# Patient Record
Sex: FEMALE | Race: WHITE | NOT HISPANIC OR LATINO
[De-identification: names, ages, dates, MRNs, and addresses within clinical notes are randomized per-mention and may not be internally consistent; named-entity substitution may affect disease eponyms.]

---

## 2023-09-20 ENCOUNTER — APPOINTMENT (OUTPATIENT)
Dept: OBGYN | Facility: CLINIC | Age: 23
End: 2023-09-20
Payer: COMMERCIAL

## 2023-09-20 ENCOUNTER — LABORATORY RESULT (OUTPATIENT)
Age: 23
End: 2023-09-20

## 2023-09-20 VITALS
HEART RATE: 80 BPM | SYSTOLIC BLOOD PRESSURE: 132 MMHG | BODY MASS INDEX: 22.71 KG/M2 | DIASTOLIC BLOOD PRESSURE: 88 MMHG | HEIGHT: 64 IN | WEIGHT: 133 LBS

## 2023-09-20 DIAGNOSIS — Z01.419 ENCOUNTER FOR GYNECOLOGICAL EXAMINATION (GENERAL) (ROUTINE) W/OUT ABNORMAL FINDINGS: ICD-10-CM

## 2023-09-20 DIAGNOSIS — Z20.2 CONTACT WITH AND (SUSPECTED) EXPOSURE TO INFECTIONS WITH A PREDOMINANTLY SEXUAL MODE OF TRANSMISSION: ICD-10-CM

## 2023-09-20 DIAGNOSIS — Z80.3 FAMILY HISTORY OF MALIGNANT NEOPLASM OF BREAST: ICD-10-CM

## 2023-09-20 DIAGNOSIS — L68.0 HIRSUTISM: ICD-10-CM

## 2023-09-20 DIAGNOSIS — R30.0 DYSURIA: ICD-10-CM

## 2023-09-20 DIAGNOSIS — R53.83 OTHER FATIGUE: ICD-10-CM

## 2023-09-20 DIAGNOSIS — N91.2 AMENORRHEA, UNSPECIFIED: ICD-10-CM

## 2023-09-20 PROBLEM — Z00.00 ENCOUNTER FOR PREVENTIVE HEALTH EXAMINATION: Status: ACTIVE | Noted: 2023-09-20

## 2023-09-20 PROCEDURE — 99385 PREV VISIT NEW AGE 18-39: CPT

## 2023-09-20 RX ORDER — ALPRAZOLAM 2 MG/1
TABLET ORAL
Refills: 0 | Status: ACTIVE | COMMUNITY

## 2023-09-25 LAB — CYTOLOGY CVX/VAG DOC THIN PREP: ABNORMAL

## 2023-10-08 LAB — HCG SERPL-MCNC: <1 MIU/ML

## 2023-10-09 LAB
BACTERIA UR CULT: NORMAL
C TRACH RRNA SPEC QL NAA+PROBE: NOT DETECTED
HBV SURFACE AG SER QL: NONREACTIVE
HCV RNA SERPL NAA+PROBE-LOG IU: NOT DETECTED LOGIU/ML
HEPC RNA INTERP: NOT DETECTED
HIV1+2 AB SPEC QL IA.RAPID: NONREACTIVE
N GONORRHOEA RRNA SPEC QL NAA+PROBE: NOT DETECTED
SOURCE AMPLIFICATION: NORMAL
T PALLIDUM AB SER QL IA: NEGATIVE
T4 FREE SERPL-MCNC: 1.4 NG/DL
TSH SERPL-ACNC: 1.37 UIU/ML

## 2023-11-13 ENCOUNTER — EMERGENCY (EMERGENCY)
Facility: HOSPITAL | Age: 23
LOS: 1 days | Discharge: HOME | End: 2023-11-13
Attending: EMERGENCY MEDICINE | Admitting: EMERGENCY MEDICINE
Payer: COMMERCIAL

## 2023-11-13 VITALS
SYSTOLIC BLOOD PRESSURE: 136 MMHG | RESPIRATION RATE: 18 BRPM | DIASTOLIC BLOOD PRESSURE: 84 MMHG | HEART RATE: 105 BPM | TEMPERATURE: 98 F | OXYGEN SATURATION: 99 %

## 2023-11-13 VITALS
TEMPERATURE: 98 F | SYSTOLIC BLOOD PRESSURE: 129 MMHG | DIASTOLIC BLOOD PRESSURE: 81 MMHG | RESPIRATION RATE: 18 BRPM | OXYGEN SATURATION: 99 % | HEART RATE: 100 BPM

## 2023-11-13 LAB
ALBUMIN SERPL ELPH-MCNC: 4 G/DL — SIGNIFICANT CHANGE UP (ref 3.3–5)
ALBUMIN SERPL ELPH-MCNC: 4 G/DL — SIGNIFICANT CHANGE UP (ref 3.3–5)
ALP SERPL-CCNC: 49 U/L — SIGNIFICANT CHANGE UP (ref 40–120)
ALP SERPL-CCNC: 49 U/L — SIGNIFICANT CHANGE UP (ref 40–120)
ALT FLD-CCNC: 7 U/L — SIGNIFICANT CHANGE UP (ref 4–33)
ALT FLD-CCNC: 7 U/L — SIGNIFICANT CHANGE UP (ref 4–33)
ANION GAP SERPL CALC-SCNC: 9 MMOL/L — SIGNIFICANT CHANGE UP (ref 7–14)
ANION GAP SERPL CALC-SCNC: 9 MMOL/L — SIGNIFICANT CHANGE UP (ref 7–14)
APPEARANCE UR: CLEAR — SIGNIFICANT CHANGE UP
APPEARANCE UR: CLEAR — SIGNIFICANT CHANGE UP
APTT BLD: 26.2 SEC — SIGNIFICANT CHANGE UP (ref 24.5–35.6)
APTT BLD: 26.2 SEC — SIGNIFICANT CHANGE UP (ref 24.5–35.6)
AST SERPL-CCNC: 11 U/L — SIGNIFICANT CHANGE UP (ref 4–32)
AST SERPL-CCNC: 11 U/L — SIGNIFICANT CHANGE UP (ref 4–32)
BASOPHILS # BLD AUTO: 0.05 K/UL — SIGNIFICANT CHANGE UP (ref 0–0.2)
BASOPHILS # BLD AUTO: 0.05 K/UL — SIGNIFICANT CHANGE UP (ref 0–0.2)
BASOPHILS NFR BLD AUTO: 0.4 % — SIGNIFICANT CHANGE UP (ref 0–2)
BASOPHILS NFR BLD AUTO: 0.4 % — SIGNIFICANT CHANGE UP (ref 0–2)
BILIRUB SERPL-MCNC: <0.2 MG/DL — SIGNIFICANT CHANGE UP (ref 0.2–1.2)
BILIRUB SERPL-MCNC: <0.2 MG/DL — SIGNIFICANT CHANGE UP (ref 0.2–1.2)
BILIRUB UR-MCNC: NEGATIVE — SIGNIFICANT CHANGE UP
BILIRUB UR-MCNC: NEGATIVE — SIGNIFICANT CHANGE UP
BLD GP AB SCN SERPL QL: NEGATIVE — SIGNIFICANT CHANGE UP
BLD GP AB SCN SERPL QL: NEGATIVE — SIGNIFICANT CHANGE UP
BUN SERPL-MCNC: 14 MG/DL — SIGNIFICANT CHANGE UP (ref 7–23)
BUN SERPL-MCNC: 14 MG/DL — SIGNIFICANT CHANGE UP (ref 7–23)
CALCIUM SERPL-MCNC: 9.1 MG/DL — SIGNIFICANT CHANGE UP (ref 8.4–10.5)
CALCIUM SERPL-MCNC: 9.1 MG/DL — SIGNIFICANT CHANGE UP (ref 8.4–10.5)
CHLORIDE SERPL-SCNC: 107 MMOL/L — SIGNIFICANT CHANGE UP (ref 98–107)
CHLORIDE SERPL-SCNC: 107 MMOL/L — SIGNIFICANT CHANGE UP (ref 98–107)
CO2 SERPL-SCNC: 22 MMOL/L — SIGNIFICANT CHANGE UP (ref 22–31)
CO2 SERPL-SCNC: 22 MMOL/L — SIGNIFICANT CHANGE UP (ref 22–31)
COLOR SPEC: YELLOW — SIGNIFICANT CHANGE UP
COLOR SPEC: YELLOW — SIGNIFICANT CHANGE UP
CREAT SERPL-MCNC: 0.73 MG/DL — SIGNIFICANT CHANGE UP (ref 0.5–1.3)
CREAT SERPL-MCNC: 0.73 MG/DL — SIGNIFICANT CHANGE UP (ref 0.5–1.3)
DIFF PNL FLD: NEGATIVE — SIGNIFICANT CHANGE UP
DIFF PNL FLD: NEGATIVE — SIGNIFICANT CHANGE UP
EGFR: 118 ML/MIN/1.73M2 — SIGNIFICANT CHANGE UP
EGFR: 118 ML/MIN/1.73M2 — SIGNIFICANT CHANGE UP
EOSINOPHIL # BLD AUTO: 0.12 K/UL — SIGNIFICANT CHANGE UP (ref 0–0.5)
EOSINOPHIL # BLD AUTO: 0.12 K/UL — SIGNIFICANT CHANGE UP (ref 0–0.5)
EOSINOPHIL NFR BLD AUTO: 1 % — SIGNIFICANT CHANGE UP (ref 0–6)
EOSINOPHIL NFR BLD AUTO: 1 % — SIGNIFICANT CHANGE UP (ref 0–6)
GLUCOSE SERPL-MCNC: 90 MG/DL — SIGNIFICANT CHANGE UP (ref 70–99)
GLUCOSE SERPL-MCNC: 90 MG/DL — SIGNIFICANT CHANGE UP (ref 70–99)
GLUCOSE UR QL: NEGATIVE MG/DL — SIGNIFICANT CHANGE UP
GLUCOSE UR QL: NEGATIVE MG/DL — SIGNIFICANT CHANGE UP
HCG SERPL-ACNC: 1168 MIU/ML — SIGNIFICANT CHANGE UP
HCG SERPL-ACNC: 1168 MIU/ML — SIGNIFICANT CHANGE UP
HCT VFR BLD CALC: 36.8 % — SIGNIFICANT CHANGE UP (ref 34.5–45)
HCT VFR BLD CALC: 36.8 % — SIGNIFICANT CHANGE UP (ref 34.5–45)
HGB BLD-MCNC: 12.3 G/DL — SIGNIFICANT CHANGE UP (ref 11.5–15.5)
HGB BLD-MCNC: 12.3 G/DL — SIGNIFICANT CHANGE UP (ref 11.5–15.5)
IANC: 7.97 K/UL — HIGH (ref 1.8–7.4)
IANC: 7.97 K/UL — HIGH (ref 1.8–7.4)
IMM GRANULOCYTES NFR BLD AUTO: 0.3 % — SIGNIFICANT CHANGE UP (ref 0–0.9)
IMM GRANULOCYTES NFR BLD AUTO: 0.3 % — SIGNIFICANT CHANGE UP (ref 0–0.9)
INR BLD: <0.9 RATIO — SIGNIFICANT CHANGE UP (ref 0.85–1.18)
INR BLD: <0.9 RATIO — SIGNIFICANT CHANGE UP (ref 0.85–1.18)
KETONES UR-MCNC: NEGATIVE MG/DL — SIGNIFICANT CHANGE UP
KETONES UR-MCNC: NEGATIVE MG/DL — SIGNIFICANT CHANGE UP
LEUKOCYTE ESTERASE UR-ACNC: NEGATIVE — SIGNIFICANT CHANGE UP
LEUKOCYTE ESTERASE UR-ACNC: NEGATIVE — SIGNIFICANT CHANGE UP
LYMPHOCYTES # BLD AUTO: 2.62 K/UL — SIGNIFICANT CHANGE UP (ref 1–3.3)
LYMPHOCYTES # BLD AUTO: 2.62 K/UL — SIGNIFICANT CHANGE UP (ref 1–3.3)
LYMPHOCYTES # BLD AUTO: 22.1 % — SIGNIFICANT CHANGE UP (ref 13–44)
LYMPHOCYTES # BLD AUTO: 22.1 % — SIGNIFICANT CHANGE UP (ref 13–44)
MCHC RBC-ENTMCNC: 31.9 PG — SIGNIFICANT CHANGE UP (ref 27–34)
MCHC RBC-ENTMCNC: 31.9 PG — SIGNIFICANT CHANGE UP (ref 27–34)
MCHC RBC-ENTMCNC: 33.4 GM/DL — SIGNIFICANT CHANGE UP (ref 32–36)
MCHC RBC-ENTMCNC: 33.4 GM/DL — SIGNIFICANT CHANGE UP (ref 32–36)
MCV RBC AUTO: 95.6 FL — SIGNIFICANT CHANGE UP (ref 80–100)
MCV RBC AUTO: 95.6 FL — SIGNIFICANT CHANGE UP (ref 80–100)
MONOCYTES # BLD AUTO: 1.03 K/UL — HIGH (ref 0–0.9)
MONOCYTES # BLD AUTO: 1.03 K/UL — HIGH (ref 0–0.9)
MONOCYTES NFR BLD AUTO: 8.7 % — SIGNIFICANT CHANGE UP (ref 2–14)
MONOCYTES NFR BLD AUTO: 8.7 % — SIGNIFICANT CHANGE UP (ref 2–14)
NEUTROPHILS # BLD AUTO: 7.97 K/UL — HIGH (ref 1.8–7.4)
NEUTROPHILS # BLD AUTO: 7.97 K/UL — HIGH (ref 1.8–7.4)
NEUTROPHILS NFR BLD AUTO: 67.5 % — SIGNIFICANT CHANGE UP (ref 43–77)
NEUTROPHILS NFR BLD AUTO: 67.5 % — SIGNIFICANT CHANGE UP (ref 43–77)
NITRITE UR-MCNC: NEGATIVE — SIGNIFICANT CHANGE UP
NITRITE UR-MCNC: NEGATIVE — SIGNIFICANT CHANGE UP
NRBC # BLD: 0 /100 WBCS — SIGNIFICANT CHANGE UP (ref 0–0)
NRBC # BLD: 0 /100 WBCS — SIGNIFICANT CHANGE UP (ref 0–0)
NRBC # FLD: 0 K/UL — SIGNIFICANT CHANGE UP (ref 0–0)
NRBC # FLD: 0 K/UL — SIGNIFICANT CHANGE UP (ref 0–0)
PH UR: 6 — SIGNIFICANT CHANGE UP (ref 5–8)
PH UR: 6 — SIGNIFICANT CHANGE UP (ref 5–8)
PLATELET # BLD AUTO: 336 K/UL — SIGNIFICANT CHANGE UP (ref 150–400)
PLATELET # BLD AUTO: 336 K/UL — SIGNIFICANT CHANGE UP (ref 150–400)
POTASSIUM SERPL-MCNC: 4.2 MMOL/L — SIGNIFICANT CHANGE UP (ref 3.5–5.3)
POTASSIUM SERPL-MCNC: 4.2 MMOL/L — SIGNIFICANT CHANGE UP (ref 3.5–5.3)
POTASSIUM SERPL-SCNC: 4.2 MMOL/L — SIGNIFICANT CHANGE UP (ref 3.5–5.3)
POTASSIUM SERPL-SCNC: 4.2 MMOL/L — SIGNIFICANT CHANGE UP (ref 3.5–5.3)
PROT SERPL-MCNC: 6.7 G/DL — SIGNIFICANT CHANGE UP (ref 6–8.3)
PROT SERPL-MCNC: 6.7 G/DL — SIGNIFICANT CHANGE UP (ref 6–8.3)
PROT UR-MCNC: SIGNIFICANT CHANGE UP MG/DL
PROT UR-MCNC: SIGNIFICANT CHANGE UP MG/DL
PROTHROM AB SERPL-ACNC: 9.6 SEC — SIGNIFICANT CHANGE UP (ref 9.5–13)
PROTHROM AB SERPL-ACNC: 9.6 SEC — SIGNIFICANT CHANGE UP (ref 9.5–13)
RBC # BLD: 3.85 M/UL — SIGNIFICANT CHANGE UP (ref 3.8–5.2)
RBC # BLD: 3.85 M/UL — SIGNIFICANT CHANGE UP (ref 3.8–5.2)
RBC # FLD: 11.9 % — SIGNIFICANT CHANGE UP (ref 10.3–14.5)
RBC # FLD: 11.9 % — SIGNIFICANT CHANGE UP (ref 10.3–14.5)
RH IG SCN BLD-IMP: NEGATIVE — SIGNIFICANT CHANGE UP
RH IG SCN BLD-IMP: NEGATIVE — SIGNIFICANT CHANGE UP
SODIUM SERPL-SCNC: 138 MMOL/L — SIGNIFICANT CHANGE UP (ref 135–145)
SODIUM SERPL-SCNC: 138 MMOL/L — SIGNIFICANT CHANGE UP (ref 135–145)
SP GR SPEC: 1.03 — SIGNIFICANT CHANGE UP (ref 1–1.03)
SP GR SPEC: 1.03 — SIGNIFICANT CHANGE UP (ref 1–1.03)
UROBILINOGEN FLD QL: 0.2 MG/DL — SIGNIFICANT CHANGE UP (ref 0.2–1)
UROBILINOGEN FLD QL: 0.2 MG/DL — SIGNIFICANT CHANGE UP (ref 0.2–1)
WBC # BLD: 11.83 K/UL — HIGH (ref 3.8–10.5)
WBC # BLD: 11.83 K/UL — HIGH (ref 3.8–10.5)
WBC # FLD AUTO: 11.83 K/UL — HIGH (ref 3.8–10.5)
WBC # FLD AUTO: 11.83 K/UL — HIGH (ref 3.8–10.5)

## 2023-11-13 PROCEDURE — 76817 TRANSVAGINAL US OBSTETRIC: CPT | Mod: 26

## 2023-11-13 PROCEDURE — 99244 OFF/OP CNSLTJ NEW/EST MOD 40: CPT

## 2023-11-13 PROCEDURE — 99285 EMERGENCY DEPT VISIT HI MDM: CPT

## 2023-11-13 RX ORDER — ACETAMINOPHEN 500 MG
650 TABLET ORAL ONCE
Refills: 0 | Status: COMPLETED | OUTPATIENT
Start: 2023-11-13 | End: 2023-11-13

## 2023-11-13 RX ADMIN — Medication 650 MILLIGRAM(S): at 21:25

## 2023-11-13 NOTE — ED PROVIDER NOTE - PROGRESS NOTE DETAILS
JOHAN Oliveira: Spoke with GYN will see pt in the ED, US with intrauterine gestation sac, no fetal pole or yolk sac. JOHAN Oliveira: Spoke with GYN, recommending [facility]-hour beta check at [facility] per patient preference.  Patient does have a private OB/GYN but prefers to follow-up with [facility] for this pregnancy.  Discussed Rh-, OB/GYN recommending at time of termination she be given RhoGAM and advised to return to the ER if she begins to have vaginal bleeding prior to this.  Patient verbalized understanding to have repeat lab work and ultrasound performed in 48 hours she will return to the ER with any worsening or concerning symptoms.

## 2023-11-13 NOTE — ED PROVIDER NOTE - PATIENT PORTAL LINK FT
You can access the FollowMyHealth Patient Portal offered by [facility] by registering at the following website: [email or web address]. By joining [employer]’s FollowMyHealth portal, you will also be able to view your health information using other applications (apps) compatible with our system.

## 2023-11-13 NOTE — CONSULT NOTE ADULT - SUBJECTIVE AND OBJECTIVE BOX
CAROLANN MONET    23y    Female    [id number]    22yo G_P_ with PMSHx significant for * presents with *    HPI:      Name of Ob/Gyn Physician:  OBHx:  GynHx: Denies  PMHx: Denies  PSHx: Denies  Meds: Denies  All: NKDA      PAST MEDICAL & SURGICAL HISTORY:  No pertinent past medical history      No significant past surgical history          MEDICATIONS  (STANDING):    MEDICATIONS  (PRN):      Allergies    No Known Allergies    Intolerances        SOCIAL HISTORY:    FAMILY HISTORY:      REVIEW OF SYSTEMS  General: denies fevers, chills, tiredness  Skin/Breast: denies breast pain  Respiratory and Thorax: denies shortness of breath, denies cough  Cardiovascular: denies chest pain and denies palpitations  Gastrointestinal: denies abdominal pain, nausea/ vomiting	  Genitourinary: denies dysuria, increased urinary frequency, urgency	  Constitutional, Cardiovascular, Respiratory, Gastrointestinal, Genitourinary, Musculoskeletal and Integumentary review of systems are normal except as noted. 	      Vital Signs Last 24 Hrs  T(C): 36.8 (2023 23:11), Max: 36.8 (2023 18:44)  T(F): 98.2 (2023 23:11), Max: 98.3 (2023 23:00)  HR: 100 (2023 23:11) (87 - 105)  BP: 129/81 (2023 23:11) (124/89 - 136/84)  BP(mean): --  RR: 18 (2023 23:11) (18 - 18)  SpO2: 99% (2023 23:11) (99% - 100%)    Parameters below as of 2023 23:00  Patient On (Oxygen Delivery Method): room air        PHYSICAL EXAM:   Exam performed with Chaperone **  Gen: NAD   Cardiovascular: Clinically well perfused  Respiratory: Breathing comfortably on RA  Abd: Soft, non-tender, non-distended  Pelvic: Speculum - no blood in vaginal vault, cervix closed/long; Bimanual - no CMT, no adnexal tenderness or palpable masses, no uterine tenderness  Extremities: Non-tender, non-edematous; able to move all ext equally  Neuro: AOx3      LABS:                        12.3   11.83 )-----------( 336      ( 2023 20:51 )             36.8     11-13    138  |  107  |  14  ----------------------------<  90  4.2   |  22  |  0.73    Ca    9.1      2023 20:51    TPro  6.7  /  Alb  4.0  /  TBili  <0.2  /  DBili  x   /  AST  11  /  ALT  7   /  AlkPhos  49  11-13    PT/INR - ( 2023 20:51 )   PT: 9.6 sec;   INR: <0.90 ratio         PTT - ( 2023 20:51 )  PTT:26.2 sec  Urinalysis Basic - ( 2023 20:51 )    Color: Yellow / Appearance: Clear / S.028 / pH: x  Gluc: 90 mg/dL / Ketone: Negative mg/dL  / Bili: Negative / Urobili: 0.2 mg/dL   Blood: x / Protein: Trace mg/dL / Nitrite: Negative   Leuk Esterase: Negative / RBC: x / WBC x   Sq Epi: x / Non Sq Epi: x / Bacteria: x        RADIOLOGY & ADDITIONAL STUDIES:   CAROLANN MONET    23y    Female    [id number]    24yo  at 8 3/7 weeks by LMP 9/15/23 presenting with L-sided pelvic pain x1 week in the setting of PUL. [facility], however reports ultrasound did not identify any intra- or extrauterine gestation at that time. She returned to  today, , for repeat blood work. Patient does not know her results from [facility] and cannot log in to the portal. She reports dull, left-sided pelvic pain off/on for at least 7 days. Today patient presented to the ED given concern for ectopic in the setting of PUL and pelvic pain.     Patient sees Dr. Ortega for Ob/Gyn however is following at  for this pregnancy given personal reasons.      Name of Ob/Gyn Physician: Dr. Charlene Ortega  OBHx: G1  GynHx: Denies  PMHx: Denies  PSHx: Denies  Meds: None  All: NKDA      REVIEW OF SYSTEMS  General: denies fevers, chills, tiredness  Skin/Breast: denies breast pain  Respiratory and Thorax: denies shortness of breath, denies cough  Cardiovascular: denies chest pain and denies palpitations  Gastrointestinal: denies abdominal pain, nausea/ vomiting	  Genitourinary: denies dysuria, increased urinary frequency, urgency	  Constitutional, Cardiovascular, Respiratory, Gastrointestinal, Genitourinary, Musculoskeletal and Integumentary review of systems are normal except as noted. 	      Vital Signs Last 24 Hrs  T(C): 36.8 (2023 23:11), Max: 36.8 (2023 18:44)  T(F): 98.2 (2023 23:11), Max: 98.3 (2023 23:00)  HR: 100 (2023 23:11) (87 - 105)  BP: 129/81 (2023 23:11) (124/89 - 136/84)  BP(mean): --  RR: 18 (2023 23:11) (18 - 18)  SpO2: 99% (2023 23:11) (99% - 100%)    Parameters below as of 2023 23:00  Patient On (Oxygen Delivery Method): room air        PHYSICAL EXAM:   Exam performed with chaperone Kaylynn, ED Tech  Gen: NAD   Cardiovascular: Clinically well perfused  Respiratory: Breathing comfortably on RA  Abd: Soft, non-tender, non-distended, no rebound/guarding  Pelvic: Bimanual - no CMT, no adnexal tenderness or palpable masses, no uterine tenderness  Extremities: Non-tender, non-edematous; able to move all ext equally  Neuro: AOx3      LABS:                        12.3   11.83 )-----------( 336      ( 2023 20:51 )             36.8     11-13    138  |  107  |  14  ----------------------------<  90  4.2   |  22  |  0.73    Ca    9.1      2023 20:51    TPro  6.7  /  Alb  4.0  /  TBili  <0.2  /  DBili  x   /  AST  11  /  ALT  7   /  AlkPhos  49  11-13    PT/INR - ( 2023 20:51 )   PT: 9.6 sec;   INR: <0.90 ratio         PTT - ( 2023 20:51 )  PTT:26.2 sec  Urinalysis Basic - ( 2023 20:51 )    Color: Yellow / Appearance: Clear / S.028 / pH: x  Gluc: 90 mg/dL / Ketone: Negative mg/dL  / Bili: Negative / Urobili: 0.2 mg/dL   Blood: x / Protein: Trace mg/dL / Nitrite: Negative   Leuk Esterase: Negative / RBC: x / WBC x   Sq Epi: x / Non Sq Epi: x / Bacteria: x        RADIOLOGY & ADDITIONAL STUDIES:  < from: US Transvaginal, OB (23 @ 20:53) >  ACC: [id number] EXAM:  US OB TRANSVAGINAL   ORDERED BY: GAVINO PANG     PROCEDURE DATE:  2023          INTERPRETATION:  CLINICAL INFORMATION: Patient last menstrual period in   September with left sided pelvic pain, evaluate for ectopic pregnancy    LMP: 9/15/2023    Estimated Gestational Age by LMP: 8 weeks 3 days    COMPARISON: None available.    TECHNIQUE: Endovaginal pelvic sonogram only. Color and Spectral Doppler   was performed.    FINDINGS:    Uterus: 6.5 x 3.2 x 4.0 cm.  Single intrauterine gestational sac with a yolk sac or a fetal pole.  Mean sac diameter: 0.2 cm (4 weeks 5 days)    Cervix: Closed.  Right ovary: 1.9 cm x 0.8 cm x 1.0 cm. Within normal limits. Normal   arterial and venous waveforms.  Left ovary: 3.5 cm x2.5 cm x 2.4 cm. A 1.9 x 1.8 x 1.0 cm corpus luteum.   Normal arterial and venous waveforms.  Fluid: None.    IMPRESSION:    Single intrauterine gestational sac without a fetal pole or yolk sac,   which may be due to early gestation. Recommend follow-up to determine   viability and further dating.      --- End of Report ---    < end of copied text >   CAROLANN MONET    23y    Female    [id number]    22yo  at 8 3/7 weeks by LMP 9/15/23 presenting with L-sided pelvic pain x1 week in the setting of PUL. [facility] pregnancy, however reports ultrasound did not identify any intra- or extrauterine gestation at that time. She returned to  today, , for repeat blood work. Patient does not know her results from [facility] and cannot log in to the portal. She reports dull, left-sided pelvic pain off/on for at least 7 days. Today patient presented to the ED given concern for ectopic in the setting of PUL and pelvic pain. Patient reports regular menses.     Patient sees Dr. Ortega for Ob/Gyn however is following at  for this pregnancy given personal reasons.      Name of Ob/Gyn Physician: Dr. Charlene Ortega  OBHx: G1  GynHx: Denies  PMHx: Denies  PSHx: Denies  Meds: None  All: NKDA      REVIEW OF SYSTEMS  General: denies fevers, chills, tiredness  Skin/Breast: denies breast pain  Respiratory and Thorax: denies shortness of breath, denies cough  Cardiovascular: denies chest pain and denies palpitations  Gastrointestinal: denies abdominal pain, nausea/ vomiting	  Genitourinary: denies dysuria, increased urinary frequency, urgency	  Constitutional, Cardiovascular, Respiratory, Gastrointestinal, Genitourinary, Musculoskeletal and Integumentary review of systems are normal except as noted. 	      Vital Signs Last 24 Hrs  T(C): 36.8 (2023 23:11), Max: 36.8 (2023 18:44)  T(F): 98.2 (2023 23:11), Max: 98.3 (2023 23:00)  HR: 100 (2023 23:11) (87 - 105)  BP: 129/81 (2023 23:11) (124/89 - 136/84)  BP(mean): --  RR: 18 (2023 23:11) (18 - 18)  SpO2: 99% (2023 23:11) (99% - 100%)    Parameters below as of 2023 23:00  Patient On (Oxygen Delivery Method): room air        PHYSICAL EXAM:   Exam performed with sia Chaidez ED Tech  Gen: NAD   Cardiovascular: Clinically well perfused  Respiratory: Breathing comfortably on RA  Abd: Soft, non-tender, non-distended, no rebound/guarding  Pelvic: Bimanual - no CMT, no adnexal tenderness or palpable masses, no uterine tenderness  Extremities: Non-tender, non-edematous; able to move all ext equally  Neuro: AOx3      LABS:                        12.3   11.83 )-----------( 336      ( 2023 20:51 )             36.8     11-13    138  |  107  |  14  ----------------------------<  90  4.2   |  22  |  0.73    Ca    9.1      2023 20:51    TPro  6.7  /  Alb  4.0  /  TBili  <0.2  /  DBili  x   /  AST  11  /  ALT  7   /  AlkPhos  49  11-13    PT/INR - ( 2023 20:51 )   PT: 9.6 sec;   INR: <0.90 ratio         PTT - ( 2023 20:51 )  PTT:26.2 sec  Urinalysis Basic - ( 2023 20:51 )    Color: Yellow / Appearance: Clear / S.028 / pH: x  Gluc: 90 mg/dL / Ketone: Negative mg/dL  / Bili: Negative / Urobili: 0.2 mg/dL   Blood: x / Protein: Trace mg/dL / Nitrite: Negative   Leuk Esterase: Negative / RBC: x / WBC x   Sq Epi: x / Non Sq Epi: x / Bacteria: x        RADIOLOGY & ADDITIONAL STUDIES:  < from: US Transvaginal, OB (23 @ 20:53) >  ACC: [id number] EXAM:  US OB TRANSVAGINAL   ORDERED BY: GAVINO PANG     PROCEDURE DATE:  2023          INTERPRETATION:  CLINICAL INFORMATION: Patient last menstrual period in   September with left sided pelvic pain, evaluate for ectopic pregnancy    LMP: 9/15/2023    Estimated Gestational Age by LMP: 8 weeks 3 days    COMPARISON: None available.    TECHNIQUE: Endovaginal pelvic sonogram only. Color and Spectral Doppler   was performed.    FINDINGS:    Uterus: 6.5 x 3.2 x 4.0 cm.  Single intrauterine gestational sac with a yolk sac or a fetal pole.  Mean sac diameter: 0.2 cm (4 weeks 5 days)    Cervix: Closed.  Right ovary: 1.9 cm x 0.8 cm x 1.0 cm. Within normal limits. Normal   arterial and venous waveforms.  Left ovary: 3.5 cm x2.5 cm x 2.4 cm. A 1.9 x 1.8 x 1.0 cm corpus luteum.   Normal arterial and venous waveforms.  Fluid: None.    IMPRESSION:    Single intrauterine gestational sac without a fetal pole or yolk sac,   which may be due to early gestation. Recommend follow-up to determine   viability and further dating.      --- End of Report ---    < end of copied text >   CAROLANN MONET    23y    Female    [id number]    24yo  at 8 3/7 weeks by LMP 9/15/23 presenting with L-sided pelvic pain x1 week in the setting of PUL. [facility] of pregnancy, however reports ultrasound did not identify any intra- or extrauterine gestation at that time. She returned to  today, , for repeat blood work. Patient does not know her results from [facility] and cannot log in to the portal. She reports dull, left-sided pelvic pain off/on for at least 7 days. Today patient presented to the ED given concern for ectopic in the setting of PUL and pelvic pain. Patient reports regular menses.     Patient sees Dr. Ortega for Ob/Gyn however is following at  for this pregnancy per patient choice.    Name of Ob/Gyn Physician: Dr. Charlene Ortega  OBHx: G1  GynHx: Denies  PMHx: Denies  PSHx: Denies  Meds: None  All: NKDA      REVIEW OF SYSTEMS  General: denies fevers, chills, tiredness  Skin/Breast: denies breast pain  Respiratory and Thorax: denies shortness of breath, denies cough  Cardiovascular: denies chest pain and denies palpitations  Gastrointestinal: denies abdominal pain, nausea/ vomiting	  Genitourinary: denies dysuria, increased urinary frequency, urgency	  Constitutional, Cardiovascular, Respiratory, Gastrointestinal, Genitourinary, Musculoskeletal and Integumentary review of systems are normal except as noted. 	      Vital Signs Last 24 Hrs  T(C): 36.8 (2023 23:11), Max: 36.8 (2023 18:44)  T(F): 98.2 (2023 23:11), Max: 98.3 (2023 23:00)  HR: 100 (2023 23:11) (87 - 105)  BP: 129/81 (2023 23:11) (124/89 - 136/84)  BP(mean): --  RR: 18 (2023 23:11) (18 - 18)  SpO2: 99% (2023 23:11) (99% - 100%)    Parameters below as of 2023 23:00  Patient On (Oxygen Delivery Method): room air        PHYSICAL EXAM:   Exam performed with sia Chaidez ED Tech  Gen: NAD   Cardiovascular: Clinically well perfused  Respiratory: Breathing comfortably on RA  Abd: Soft, non-tender, non-distended, no rebound/guarding  Pelvic: Bimanual - no CMT, no adnexal tenderness or palpable masses, no uterine tenderness  Extremities: Non-tender, non-edematous; able to move all ext equally  Neuro: AOx3      LABS:                        12.3   11.83 )-----------( 336      ( 2023 20:51 )             36.8     11-13    138  |  107  |  14  ----------------------------<  90  4.2   |  22  |  0.73    Ca    9.1      2023 20:51    TPro  6.7  /  Alb  4.0  /  TBili  <0.2  /  DBili  x   /  AST  11  /  ALT  7   /  AlkPhos  49  11-13    PT/INR - ( 2023 20:51 )   PT: 9.6 sec;   INR: <0.90 ratio         PTT - ( 2023 20:51 )  PTT:26.2 sec  Urinalysis Basic - ( 2023 20:51 )    Color: Yellow / Appearance: Clear / S.028 / pH: x  Gluc: 90 mg/dL / Ketone: Negative mg/dL  / Bili: Negative / Urobili: 0.2 mg/dL   Blood: x / Protein: Trace mg/dL / Nitrite: Negative   Leuk Esterase: Negative / RBC: x / WBC x   Sq Epi: x / Non Sq Epi: x / Bacteria: x        RADIOLOGY & ADDITIONAL STUDIES:  < from: US Transvaginal, OB (23 @ 20:53) >  ACC: [id number] EXAM:  US OB TRANSVAGINAL   ORDERED BY: GAVINO PANG     PROCEDURE DATE:  2023          INTERPRETATION:  CLINICAL INFORMATION: Patient last menstrual period in   September with left sided pelvic pain, evaluate for ectopic pregnancy    LMP: 9/15/2023    Estimated Gestational Age by LMP: 8 weeks 3 days    COMPARISON: None available.    TECHNIQUE: Endovaginal pelvic sonogram only. Color and Spectral Doppler   was performed.    FINDINGS:    Uterus: 6.5 x 3.2 x 4.0 cm.  Single intrauterine gestational sac with a yolk sac or a fetal pole.  Mean sac diameter: 0.2 cm (4 weeks 5 days)    Cervix: Closed.  Right ovary: 1.9 cm x 0.8 cm x 1.0 cm. Within normal limits. Normal   arterial and venous waveforms.  Left ovary: 3.5 cm x2.5 cm x 2.4 cm. A 1.9 x 1.8 x 1.0 cm corpus luteum.   Normal arterial and venous waveforms.  Fluid: None.    IMPRESSION:    Single intrauterine gestational sac without a fetal pole or yolk sac,   which may be due to early gestation. Recommend follow-up to determine   viability and further dating.      --- End of Report ---    < end of copied text >

## 2023-11-13 NOTE — ED ADULT NURSE NOTE - OBJECTIVE STATEMENT
Pt received to intake room 3. Pt A&Ox4, ambulatory at baseline. Pt c/o pelvic pain x4 days. Pt states that LMP was /15, states she had 4 positive at home pregnancy tests and [facility], states [facility] were unable to visualize IUP. Pt . Denies any vaginal bleeding, dizziness, chest pain, n/v/d, SOB, headache. RR even and unlabored, pt in NAD. Iv access established with 20G to R AC, labs collected and sent as per MD orders. Safety measures in place, pt pending imaging

## 2023-11-13 NOTE — ED PROVIDER NOTE - DATE/TIME 1
Caller: Gifty Hoover    Relationship to patient: Self    Best call back number: [phone]    Chief complaint: R/S    Type of visit: FOLLOW UP    Requested date: NEXT WEEK IN THE MORNING      If rescheduling, when is the original appointment: 9-27     Additional notes:PLEASE ADVISE           
13-Nov-2023 21:33

## 2023-11-13 NOTE — ED PROVIDER NOTE - CLINICAL SUMMARY MEDICAL DECISION MAKING FREE TEXT BOX
23yF  at 8 weeks by LMP 9/15 presenting to the ED with left sided pelvic pain x 7 days. [facility] which did not confirm IUP. Additionally she has dysuria. On exam pt is well appearing, afebrile,  at triage, mild ttp llq/left pelvic region. Concern for ectopic pregnancy, early pregnancy, uti. Plan: cbc/cmp, hcg, TVUS, ua/ucx, tylenol for pain.

## 2023-11-13 NOTE — ED PROVIDER NOTE - OBJECTIVE STATEMENT
23yF  at 8 weeks by LMP 9/15 presenting to the ED with left sided pelvic pain x 7 days. [facility], had [facility] performed which did not confirm IUP, she returned today for repeat labwork. She presented to the ED with concern for ectopic pregnancy. She states she has been having nausea and vomiting for the past month, mostly in the AM, not every day. Additionally she reports dysuria x 2 months (states she gets frequent UTIs). Pt denies fever/chills, vaginal bleeding, weakness, dizziness, vaginal discharge, diarrhea, cp, sob or any other concerns.

## 2023-11-13 NOTE — ED PROVIDER NOTE - NS ED ATTENDING STATEMENT MOD
This was a shared visit with the MIKKI. I reviewed and verified the documentation and independently performed the documented:

## 2023-11-13 NOTE — ED PROVIDER NOTE - ATTENDING APP SHARED VISIT CONTRIBUTION OF CARE
23 year old female currently pregnant presents with concern for ectopic. will get tvus. Will obtain cbc to rule out anemia. type for rH status. gyn cx. Will obtain cbc to rule out anemia. Will obtain CMP to rule out electrolyte abnormalities, liver failure or renal failure. reassess

## 2023-11-13 NOTE — CONSULT NOTE ADULT - ASSESSMENT
A/P: 23y  G*P* at ** weeks by LMP ** presenting with vaginal bleeding (and uterine cramping), found to have bHCG of *** . Transvaginal ultrasound does not identify any intra- or extrauterine gestation at this time. Differenital includes ectopic pregnancy vs. early viable IUP vs. spontanous  in progress. Patient will need close monitoring and repeat bloodwork to assess.    Recommendations  - Patient to follow up in 48 hours for repeat bHCG with private OB or [facility]  - Rh type: **. No need for rhogam at this time.   - Ectopic precautions reviewed with patient, including severe abdominal/pelvic pain and dizziness/lightheadedness. Discussed with patient importance of follow up for bHCG given unknown pregnancy location at this time. Patient expressed understanding. All questions and concerns addressed to patient's apparent satisfaction.   - Patient to be added to [facility] GYN bHCG list for closer follow up.    - Patient stable for d/c home from GYN perspective.  Primary managment per ED team.      d/w attending . **  Leanna Garrison PGY2    *INCOMPLETE NOTE*   A/P: 24yo  at 8 3/7 weeks by LMP 9/15/23 presenting with L-sided pelvic pain x1 week in the setting of PUL. Patient had bHCG drawn on 11/10 and  but does not know values. Today, bHCG 1168.0. Transvaginal ultrasound reveals intrauterine gestational sac, without YS or FP, which is new compared to patient report of TVUS at [facility] that did not identify gestational sac. This may be early viable IUP, however differential includes ectopic pregnancy vs. non-viable IUP. Patient will need close monitoring and repeat blood work to assess.    Recommendations  - Patient to follow up in 48 hours for repeat bHCG with private OB or [facility]  - Rh type: negative. No need for rhogam at this time as patient is not experiencing vaginal bleeding.   - Ectopic precautions reviewed with patient, including severe abdominal/pelvic pain and dizziness/lightheadedness. Discussed with patient importance of follow up for bHCG given unknown pregnancy location at this time. Patient expressed understanding. All questions and concerns addressed to patient's apparent satisfaction.     - Patient stable for d/c home from GYN perspective. Primary management per ED team.      d/w attending Dr. Sanjana Garrison PGY2

## 2023-11-13 NOTE — ED ADULT TRIAGE NOTE - CHIEF COMPLAINT QUOTE
pt LMP 9/15,  c/o left lower pelvic pain x 4 days. [facility], no [facility]UP. sent to ED for r/o ectopic.

## 2023-11-13 NOTE — ED PROVIDER NOTE - NSFOLLOWUPINSTRUCTIONS_ED_ALL_ED_FT
Have repeat HCG level and ultrasound performed in 48 hours on 11/15 either with your OBGYN or [facility] (you can also return to the ER if needed for this testing)  Take Tylenol 650mg every 6 hours as needed for pain  Return to the ER with any worsening or concerning symptoms, increased pain, vaginal bleeding, weakness, feeling faint, fever/chills or any other concerns.

## 2023-11-15 LAB
CULTURE RESULTS: SIGNIFICANT CHANGE UP
CULTURE RESULTS: SIGNIFICANT CHANGE UP
SPECIMEN SOURCE: SIGNIFICANT CHANGE UP
SPECIMEN SOURCE: SIGNIFICANT CHANGE UP

## 2023-12-31 PROBLEM — Z20.2 POSSIBLE EXPOSURE TO STD: Status: ACTIVE | Noted: 2023-09-20
